# Patient Record
Sex: MALE | URBAN - METROPOLITAN AREA
[De-identification: names, ages, dates, MRNs, and addresses within clinical notes are randomized per-mention and may not be internally consistent; named-entity substitution may affect disease eponyms.]

---

## 2021-11-11 ENCOUNTER — OFFICE VISIT (OUTPATIENT)
Dept: URBAN - METROPOLITAN AREA CLINIC 88 | Facility: CLINIC | Age: 76
End: 2021-11-11
Payer: MEDICARE

## 2021-11-11 DIAGNOSIS — H52.4 PRESBYOPIA: ICD-10-CM

## 2021-11-11 DIAGNOSIS — H02.834 DERMATOCHALASIS OF LEFT UPPER EYELID: ICD-10-CM

## 2021-11-11 DIAGNOSIS — H02.831 DERMATOCHALASIS OF RIGHT UPPER EYELID: ICD-10-CM

## 2021-11-11 DIAGNOSIS — H17.89 OTHER CORNEAL SCARS AND OPACITIES: ICD-10-CM

## 2021-11-11 DIAGNOSIS — H25.12 AGE-RELATED NUCLEAR CATARACT, LEFT EYE: ICD-10-CM

## 2021-11-11 DIAGNOSIS — H43.811 VITREOUS DEGENERATION, RIGHT EYE: ICD-10-CM

## 2021-11-11 DIAGNOSIS — H35.371 PUCKERING OF MACULA, RIGHT EYE: ICD-10-CM

## 2021-11-11 DIAGNOSIS — H34.12 CENTRAL RETINAL ARTERY OCCLUSION, LEFT EYE: Primary | ICD-10-CM

## 2021-11-11 PROCEDURE — 99203 OFFICE O/P NEW LOW 30 MIN: CPT | Performed by: OPTOMETRIST

## 2021-11-11 PROCEDURE — 92133 CPTRZD OPH DX IMG PST SGM ON: CPT | Performed by: OPTOMETRIST

## 2021-11-11 ASSESSMENT — INTRAOCULAR PRESSURE
OD: 17
OS: 17

## 2021-11-11 ASSESSMENT — VISUAL ACUITY
OD: 20/25
OS: CF 3FT

## 2021-11-11 NOTE — IMPRESSION/PLAN
Impression: Presbyopia: H52.4. Plan: Reviewed refractive prescription in detail with patient and need for glasses to improve vision. Release spectacle prescription at this time. Discussed polycarbonate lenses and importance of protecting OD.

## 2021-11-11 NOTE — IMPRESSION/PLAN
Impression: Central retinal artery occlusion, left eye: H34.12. Plan: Longstanding condition. H/O CRAO OS with prior workup 10 years ago. Baseline OCT macula and ON today. Patient to continue care with PCP for cardiovascular care. Importance of retinal exams reviewed. Importance of eye protection OD discussed. Reviewed disk size with patient and potential medications associated with Nonarteritic ischemic neuropathy. Will continue to observe with dilated examination in 12 months.

## 2022-11-11 ENCOUNTER — OFFICE VISIT (OUTPATIENT)
Dept: URBAN - METROPOLITAN AREA CLINIC 88 | Facility: CLINIC | Age: 77
End: 2022-11-11
Payer: MEDICARE

## 2022-11-11 DIAGNOSIS — H17.89 OTHER CORNEAL SCARS AND OPACITIES: ICD-10-CM

## 2022-11-11 DIAGNOSIS — H26.491 OTHER SECONDARY CATARACT, RIGHT EYE: ICD-10-CM

## 2022-11-11 DIAGNOSIS — Z96.1 PRESENCE OF PSEUDOPHAKIA: ICD-10-CM

## 2022-11-11 DIAGNOSIS — H35.373 PUCKERING OF MACULA, BILATERAL: ICD-10-CM

## 2022-11-11 DIAGNOSIS — H34.12 CENTRAL RETINAL ARTERY OCCLUSION, LEFT EYE: Primary | ICD-10-CM

## 2022-11-11 DIAGNOSIS — H43.813 VITREOUS DEGENERATION, BILATERAL: ICD-10-CM

## 2022-11-11 DIAGNOSIS — H25.12 AGE-RELATED NUCLEAR CATARACT, LEFT EYE: ICD-10-CM

## 2022-11-11 PROCEDURE — 99213 OFFICE O/P EST LOW 20 MIN: CPT | Performed by: OPTOMETRIST

## 2022-11-11 ASSESSMENT — INTRAOCULAR PRESSURE
OS: 17
OD: 17

## 2022-11-11 ASSESSMENT — VISUAL ACUITY: OD: 20/20

## 2022-11-11 NOTE — IMPRESSION/PLAN
Impression: Puckering of macula, bilateral: H35.373. Bilateral. Plan: A detailed explanation of the condition was provided to the patient. Monitor at this time as surgery is not recommended. Patient knows to return to clinic if vision begins to decrease prior to their next scheduled examination.

## 2022-11-11 NOTE — IMPRESSION/PLAN
Impression: Central retinal artery occlusion, left eye: H34.12. Plan: Longstanding condition. H/O CRAO OS with prior workup 10 years ago. Baseline OCT macula and ON at prior visit. Patient to continue care with PCP for cardiovascular care. Importance of retinal exams reviewed. Importance of eye protection OD discussed. Reviewed disk size with patient and potential medications associated with Nonarteritic ischemic neuropathy. Will continue to observe with dilated examination in 12 months.

## 2023-11-28 NOTE — IMPRESSION/PLAN
Impression: Presence of pseudophakia: Z96.1 Right. Plan: Discussed status in detail with patient. Will continue to observe. No Labs/Imaging Studies/EKG